# Patient Record
Sex: MALE | Race: BLACK OR AFRICAN AMERICAN | NOT HISPANIC OR LATINO | URBAN - METROPOLITAN AREA
[De-identification: names, ages, dates, MRNs, and addresses within clinical notes are randomized per-mention and may not be internally consistent; named-entity substitution may affect disease eponyms.]

---

## 2017-01-07 ENCOUNTER — EMERGENCY (EMERGENCY)
Facility: HOSPITAL | Age: 37
LOS: 1 days | Discharge: HOME | End: 2017-01-07
Attending: EMERGENCY MEDICINE | Admitting: EMERGENCY MEDICINE
Payer: COMMERCIAL

## 2017-01-07 VITALS
OXYGEN SATURATION: 100 % | SYSTOLIC BLOOD PRESSURE: 113 MMHG | HEART RATE: 81 BPM | DIASTOLIC BLOOD PRESSURE: 80 MMHG | TEMPERATURE: 98 F | RESPIRATION RATE: 18 BRPM

## 2017-01-07 PROCEDURE — 99283 EMERGENCY DEPT VISIT LOW MDM: CPT

## 2017-01-07 RX ORDER — CYCLOBENZAPRINE HYDROCHLORIDE 10 MG/1
1 TABLET, FILM COATED ORAL
Qty: 15 | Refills: 0 | OUTPATIENT
Start: 2017-01-07 | End: 2017-01-12

## 2017-01-07 RX ORDER — KETOROLAC TROMETHAMINE 30 MG/ML
30 SYRINGE (ML) INJECTION ONCE
Qty: 0 | Refills: 0 | Status: DISCONTINUED | OUTPATIENT
Start: 2017-01-07 | End: 2017-01-07

## 2017-01-07 RX ADMIN — Medication 30 MILLIGRAM(S): at 13:20
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
## 2017-01-07 NOTE — ED PROVIDER NOTE - NS ED MD SCRIBE ATTENDING SCRIBE SECTIONS
DISPOSITION/HIV/HISTORY OF PRESENT ILLNESS/VITAL SIGNS( Pullset)/PHYSICAL EXAM/PAST MEDICAL/SURGICAL/SOCIAL HISTORY/REVIEW OF SYSTEMS

## 2018-08-13 ENCOUNTER — EMERGENCY (EMERGENCY)
Facility: HOSPITAL | Age: 38
LOS: 1 days | Discharge: HOME | End: 2018-08-13
Attending: EMERGENCY MEDICINE
Payer: SELF-PAY

## 2018-08-13 VITALS
SYSTOLIC BLOOD PRESSURE: 124 MMHG | HEART RATE: 77 BPM | TEMPERATURE: 98 F | RESPIRATION RATE: 18 BRPM | DIASTOLIC BLOOD PRESSURE: 76 MMHG | OXYGEN SATURATION: 97 %

## 2018-08-13 PROCEDURE — 99284 EMERGENCY DEPT VISIT MOD MDM: CPT

## 2018-08-13 PROCEDURE — 99283 EMERGENCY DEPT VISIT LOW MDM: CPT

## 2018-08-13 RX ORDER — IBUPROFEN 200 MG
600 TABLET ORAL ONCE
Qty: 0 | Refills: 0 | Status: COMPLETED | OUTPATIENT
Start: 2018-08-13 | End: 2018-08-13

## 2018-08-13 RX ORDER — ACETAMINOPHEN 500 MG
650 TABLET ORAL ONCE
Qty: 0 | Refills: 0 | Status: COMPLETED | OUTPATIENT
Start: 2018-08-13 | End: 2018-08-13

## 2018-08-13 RX ADMIN — Medication 650 MILLIGRAM(S): at 22:13

## 2018-08-13 RX ADMIN — Medication 600 MILLIGRAM(S): at 22:13

## 2018-08-13 NOTE — ED PROVIDER NOTE - ATTENDING CONTRIBUTION TO CARE
Dr. Edmondson (Attending Physician)  I performed a history and physical exam of the patient and discussed their management with the resident. I reviewed the resident's note and agree with the documented findings and plan of care. My medical decision making and observations are found above.
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
## 2018-08-13 NOTE — ED PROVIDER NOTE - PHYSICAL EXAMINATION
A primary and secondary survey was performed. Airway: oropharynx clear, Breathing: normal breath sounds bilaterally, pt phonating well, Circulation: Mentation normal, pulses palpated in all 4 limbs, no obvious active external hemorrhage, lungs/abd/pelvis/legs wo signs of blood accumulation. Disability: Neuro intact / pupils equal round reactive. Exposure: No external signs of trauma. Spine including c-spine non-tender. No neck pain and ROM wnl. C/spine cleared by nexus criteria.    Gen: Well appearing not in distress. Head: NC,AT. No reeves sign/raccoon eyes. ENT: No hemoTM, oropharynx as above, no nasal hemorrhage. Neck: as above. Chest: Lung exam- CTAB, no ttp in chest wall. Cardiac: RRR S1S2, No JVD. Abd: soft, non-tender. Normal in color. Pelvis: Stable. Ext: no ttp in all 4 limbs, rom at shoulder, elbow, hip and knee wnl, Skin: No Abrasions or lacerations. Neuro: GCS 15 , Moving 4 limbs, Speech fluid and clear, able to ambulate. Psych: Normal affect, judgment.
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
